# Patient Record
Sex: MALE | NOT HISPANIC OR LATINO
[De-identification: names, ages, dates, MRNs, and addresses within clinical notes are randomized per-mention and may not be internally consistent; named-entity substitution may affect disease eponyms.]

---

## 2021-09-10 PROBLEM — Z00.00 ENCOUNTER FOR PREVENTIVE HEALTH EXAMINATION: Status: ACTIVE | Noted: 2021-09-10

## 2021-09-20 ENCOUNTER — APPOINTMENT (OUTPATIENT)
Dept: OTOLARYNGOLOGY | Facility: CLINIC | Age: 31
End: 2021-09-20

## 2022-01-31 ENCOUNTER — NON-APPOINTMENT (OUTPATIENT)
Age: 32
End: 2022-01-31

## 2022-02-02 ENCOUNTER — APPOINTMENT (OUTPATIENT)
Dept: OTOLARYNGOLOGY | Facility: CLINIC | Age: 32
End: 2022-02-02
Payer: MEDICAID

## 2022-02-02 VITALS — HEIGHT: 74 IN | BODY MASS INDEX: 23.74 KG/M2 | WEIGHT: 185 LBS

## 2022-02-02 PROCEDURE — 99204 OFFICE O/P NEW MOD 45 MIN: CPT | Mod: 25

## 2022-02-02 PROCEDURE — 69210 REMOVE IMPACTED EAR WAX UNI: CPT

## 2022-02-02 NOTE — PHYSICAL EXAM
[Midline] : trachea located in midline position [Normal] : no rashes [FreeTextEntry1] : Procedure: Microscopic Ear Exam with debridement\par \par Left ear:  Ear canal intact without inflammation or lesion.  \par Tympanic membrane intact without inflammation.\par \par Right ear:  Obstructing cerumen debrided with a curet.  Ear canal intact without inflammation or lesion.  \par Tympanic membrane intact without inflammation. Limited pars flaccida retraction with no visible mass.\par \par [place] Hearing Assessment\par 512 Hz:\par Richards test: referred to the bilateral\par Rinne test:\par 	Right Ear: Air Conduction < Bone Conduction\par 	Left Ear:   Air Conduction < Bone conduction\par \par

## 2022-02-02 NOTE — HISTORY OF PRESENT ILLNESS
[de-identified] : PEDRO JOHNSON has a history of progressive hearing loss bilaterally since or about 2017. Uncertain etiology, but relates to stress.  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  No procedures.

## 2022-02-02 NOTE — CONSULT LETTER
[Please see my note below.] : Please see my note below. [FreeTextEntry2] : Dr Sheri Wnislow\par [address] [facility]\[place] [address] [FreeTextEntry1] : Thank you for allowing me to participate in the care of PEDRO JOHNSON .\par Please see the attached visit note.\par \par \par \par Simon Ozuna\par Otology\par Medical Director of [facility]\par Department of Otolaryngology\par [facility]

## 2022-02-02 NOTE — DATA REVIEWED
[de-identified] : Multiple audiograms provided and reviewed. Testing in November 2021, reveals bilateral conductive hearing loss affecting the right ear greater than the left. Recognition is intact bilaterally. Acoustic reflexes are absent bilaterally [de-identified] : The patient had multiple images from a CT scan of his temporal bone on his telephone. A brief view which was incomplete, revealed bilateral ossicular chain intact. No visible otosclerosis.

## 2022-02-02 NOTE — ASSESSMENT
[FreeTextEntry1] : Significant conductive hearing loss bilaterally, which is interfering with the patient's daily, communication. He is dissatisfied with a recent trial with hearing aids. Management options carefully reviewed in detail.\par \par The etiology of his hearing loss is undetermined. The most likely diagnosis is otosclerosis. A limited pars flaccida retraction is visible and may represent occult cholesteatoma over there is little evidence for this. I have requested an opportunity to review his CT scan in its entirety. We discussed the option of middle ear exploration for surgical repair of conductive hearing loss. He wished to consider this. All risks limitations, complications and alternatives reviewed in detail.  Questions answered.\par \par Surgical plan: Right laser stapedotomy, left hand, vein graft, possible cartilage tympanoplasty ossiculoplasty.\par \par

## 2022-02-15 ENCOUNTER — NON-APPOINTMENT (OUTPATIENT)
Age: 32
End: 2022-02-15

## 2022-04-21 ENCOUNTER — APPOINTMENT (OUTPATIENT)
Age: 32
End: 2022-04-21

## 2022-04-25 ENCOUNTER — APPOINTMENT (OUTPATIENT)
Dept: OTOLARYNGOLOGY | Facility: CLINIC | Age: 32
End: 2022-04-25

## 2022-05-12 ENCOUNTER — APPOINTMENT (OUTPATIENT)
Dept: OTOLARYNGOLOGY | Facility: CLINIC | Age: 32
End: 2022-05-12
Payer: MEDICAID

## 2022-05-12 PROCEDURE — 99212 OFFICE O/P EST SF 10 MIN: CPT | Mod: 95

## 2022-05-12 NOTE — HISTORY OF PRESENT ILLNESS
[Home] : at home, [unfilled] , at the time of the visit. [Medical Office: ([facility])___] : at the medical office located in  [Verbal consent obtained from patient] : the patient, [unfilled] [de-identified] : PEDRO JOHNSON has a history of progressive hearing loss bilaterally since or about 2017. Uncertain etiology, but relates to stress.  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  No procedures. \par \par April 2022: plan stapes surgery. Patient canceled surgery to anxiety [FreeTextEntry1] : may 12, 2022:\par \par Tele-health visit for review of planning. The patient wishes to proceed with surgical intervention as previously scheduled. He feels that his anxiety got the better of him last time and he has reviewed it. He feels comfortable going forward. I have discussed this with him in detail. I have answered his questions.

## 2022-05-12 NOTE — ASSESSMENT
[FreeTextEntry1] : operative intervention discussed with patient in detail.He is comfortable moving forward with surgical planning. I have answered his questions. We reviewed the procedure in detail.\par \par He wished to proceed with surgical intervention.

## 2022-05-19 ENCOUNTER — APPOINTMENT (OUTPATIENT)
Age: 32
End: 2022-05-19
Payer: MEDICAID

## 2022-05-19 ENCOUNTER — TRANSCRIPTION ENCOUNTER (OUTPATIENT)
Age: 32
End: 2022-05-19

## 2022-05-19 ENCOUNTER — RESULT REVIEW (OUTPATIENT)
Age: 32
End: 2022-05-19

## 2022-05-19 PROCEDURE — 69661 REVISE MIDDLE EAR BONE: CPT | Mod: RT

## 2022-05-19 PROCEDURE — 95867 NDL EMG CRANIAL NRV MUSC UNI: CPT | Mod: 26

## 2022-05-19 PROCEDURE — 37799 UNLISTED PX VASCULAR SURGERY: CPT | Mod: RT

## 2022-05-23 ENCOUNTER — APPOINTMENT (OUTPATIENT)
Dept: OTOLARYNGOLOGY | Facility: CLINIC | Age: 32
End: 2022-05-23
Payer: MEDICAID

## 2022-05-23 DIAGNOSIS — H90.0 CONDUCTIVE HEARING LOSS, BILATERAL: ICD-10-CM

## 2022-05-23 PROCEDURE — 99024 POSTOP FOLLOW-UP VISIT: CPT

## 2022-05-23 NOTE — HISTORY OF PRESENT ILLNESS
[de-identified] : PEDRO JOHNSON has a history of progressive hearing loss bilaterally since or about 2017. Uncertain etiology, but relates to stress.  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  No procedures. \par \par April 2022: plan stapes surgery. Patient canceled surgery to anxiety [FreeTextEntry1] : 05/23/2022 \par History\par post operative visit.\par Reports no significant pain.  No significant tinnitus.  No significant vertigo.  No bleeding reported.\par \par Physical Exam\par \par Face: Normal Function bilaterally\par \par Oral: Normal\par \par Neck: No mass, Full range of motion\par \par Hand: Incision intact, non inflamed\par \par \par Microscopic Ear Exam\par Right Ear:  Ear canal packing removed gently with forceps.  No significant inflammation noted.  The tympanic membrane is intact.\par \par T[place] Testing\par 512 Hz:\par Richards test: referred to the Right Ear\par \par Wound care instructions were reviewed with the patient in detail. Followup plans discussed.

## 2022-06-13 ENCOUNTER — NON-APPOINTMENT (OUTPATIENT)
Age: 32
End: 2022-06-13

## 2022-06-20 ENCOUNTER — APPOINTMENT (OUTPATIENT)
Dept: OTOLARYNGOLOGY | Facility: CLINIC | Age: 32
End: 2022-06-20
Payer: MEDICAID

## 2022-06-20 VITALS — TEMPERATURE: 97.2 F | HEIGHT: 74 IN | BODY MASS INDEX: 23.74 KG/M2 | WEIGHT: 185 LBS

## 2022-06-20 PROCEDURE — 92550 TYMPANOMETRY & REFLEX THRESH: CPT

## 2022-06-20 PROCEDURE — 99024 POSTOP FOLLOW-UP VISIT: CPT

## 2022-06-20 PROCEDURE — 92557 COMPREHENSIVE HEARING TEST: CPT

## 2022-06-20 NOTE — HISTORY OF PRESENT ILLNESS
[de-identified] : PEDRO JOHNSON has a history of progressive hearing loss bilaterally since or about 2017. Uncertain etiology, but relates to stress.  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  No procedures. \par \par April 2022: plan stapes surgery. Patient canceled surgery to anxiety [FreeTextEntry1] : 06/20/2022 \par History\par post operative visit.\par Reports no significant pain.  No significant tinnitus.  No significant vertigo. Hearing has improved\par \par Physical Exam\par \par Hand: Incision intact, non inflamed\par \par Microscopic Ear Exam\par Right Ear:  Ear canal healing well.  The tympanic membrane is intact.\par \par Complete audiometry was ordered and completed today. This was separately reported by the audiologist. The results were reviewed in detail with the patient.\par \par \par

## 2022-06-20 NOTE — ASSESSMENT
[FreeTextEntry1] : Good hearing results following right stapes surgery.  Patient is interested in surgical repair of the left ear.  I have discussed this with him in detail.  All risks limitations, complications and alternatives reviewed in detail.  Questions answered.\par \par Surgical plan: Left laser stapedotomy, right hand vein graft

## 2022-07-28 ENCOUNTER — APPOINTMENT (OUTPATIENT)
Dept: OTOLARYNGOLOGY | Facility: CLINIC | Age: 32
End: 2022-07-28

## 2022-07-28 PROCEDURE — 99442: CPT | Mod: 24

## 2022-07-28 NOTE — HISTORY OF PRESENT ILLNESS
[de-identified] : PEDRO JOHNSON has a history of progressive hearing loss bilaterally since or about 2017. Uncertain etiology, but relates to stress.  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  No procedures. \par \par April 2022: plan stapes surgery. Patient canceled surgery to anxiety [FreeTextEntry1] : 07/28/2022 \par hearing better. but feels that hearing may have recently worsened partially (not sure). still  has tinnitus in right ear worse that the left. \par  pain, arm

## 2022-07-28 NOTE — ASSESSMENT
[FreeTextEntry1] : The patient reports significantly improved hearing in the operated right ear. Tinnitus persists. This is better in the right ear the left. I have reviewed wound care and activities following his surgery.We also discussed the option of surgical intervention for his left ear.\par \par I have recommended repeat audiometry  approximately 3 months. I recommended follow up after completing audiometry.

## 2022-11-01 ENCOUNTER — APPOINTMENT (OUTPATIENT)
Dept: OTOLARYNGOLOGY | Facility: CLINIC | Age: 32
End: 2022-11-01

## 2022-11-01 ENCOUNTER — LABORATORY RESULT (OUTPATIENT)
Age: 32
End: 2022-11-01

## 2022-11-01 VITALS — TEMPERATURE: 97.3 F | HEIGHT: 74 IN | WEIGHT: 185 LBS | BODY MASS INDEX: 23.74 KG/M2

## 2022-11-01 DIAGNOSIS — H61.20 IMPACTED CERUMEN, UNSPECIFIED EAR: ICD-10-CM

## 2022-11-01 DIAGNOSIS — H93.299 OTHER ABNORMAL AUDITORY PERCEPTIONS, UNSPECIFIED EAR: ICD-10-CM

## 2022-11-01 PROCEDURE — 92550 TYMPANOMETRY & REFLEX THRESH: CPT | Mod: 52

## 2022-11-01 PROCEDURE — 99213 OFFICE O/P EST LOW 20 MIN: CPT | Mod: 25

## 2022-11-01 PROCEDURE — G0268 REMOVAL OF IMPACTED WAX MD: CPT

## 2022-11-01 PROCEDURE — 92557 COMPREHENSIVE HEARING TEST: CPT

## 2022-11-01 NOTE — ASSESSMENT
[FreeTextEntry1] : Improved hearing in the right ear following stapedectomy May 2022.  The patient wishes to proceed with surgical planning for the left ear.  Management options reviewed.  All risks limitations, complications and alternatives reviewed in detail.  Questions answered.\par \par

## 2022-11-01 NOTE — HISTORY OF PRESENT ILLNESS
[de-identified] : PEDRO JOHNSON has a history of progressive hearing loss bilaterally since or about 2017. Uncertain etiology, but relates to stress.  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  No procedures. \par \par April 2022: plan stapes surgery. Patient canceled surgery to anxiety [FreeTextEntry1] : 11/01/2022\par \par hearing better. tinnitus is noted uncertain laterality. No vertigo. \par Still reports significant hearing loss in the left ear

## 2022-11-01 NOTE — DATA REVIEWED
[de-identified] : In light of the patients current symptoms, Complete audiometry was ordered and completed today. I have interpreted these results and reviewed them in detail with the patient.\par \par Significant conductive hearing loss in the left ear.  Mild hearing loss remains in the right ear.

## 2022-11-03 ENCOUNTER — RESULT REVIEW (OUTPATIENT)
Age: 32
End: 2022-11-03

## 2022-11-03 ENCOUNTER — APPOINTMENT (OUTPATIENT)
Age: 32
End: 2022-11-03

## 2022-11-03 PROCEDURE — 95867 NDL EMG CRANIAL NRV MUSC UNI: CPT | Mod: 26

## 2022-11-03 PROCEDURE — 69661 REVISE MIDDLE EAR BONE: CPT | Mod: LT

## 2022-11-03 PROCEDURE — 37799 UNLISTED PX VASCULAR SURGERY: CPT | Mod: RT

## 2022-11-09 ENCOUNTER — APPOINTMENT (OUTPATIENT)
Dept: OTOLARYNGOLOGY | Facility: CLINIC | Age: 32
End: 2022-11-09

## 2022-11-09 VITALS — WEIGHT: 185 LBS | TEMPERATURE: 97.1 F | HEIGHT: 74 IN | BODY MASS INDEX: 23.74 KG/M2

## 2022-11-09 PROCEDURE — 99024 POSTOP FOLLOW-UP VISIT: CPT

## 2022-11-09 NOTE — HISTORY OF PRESENT ILLNESS
[de-identified] : PEDRO JOHNSON has a history of progressive hearing loss bilaterally since or about 2017. Uncertain etiology, but relates to stress.  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  No procedures. \par \par April 2022: plan stapes surgery. Patient canceled surgery to anxiety [FreeTextEntry1] : 11/09/2022 \par History\par post operative visit.\par Reports no significant pain.  No significant tinnitus.  No significant vertigo.  No bleeding reported.\par \par Physical Exam\par \par Face: Normal Function bilaterally\par \par Oral: Normal\par \par Neck: No mass, Full range of motion\par \par Hand: Incision intact, non inflamed\par \par \par Microscopic Ear Exam\par Left Ear:  Ear canal packing removed gently with forceps.  No significant inflammation noted.  The tympanic membrane is intact.\par \par T[place] Testing\par 512 Hz:\par Richards test: referred to the midline \par \par Wound care instructions were reviewed with the patient in detail. Followup plans discussed.

## 2022-12-14 ENCOUNTER — APPOINTMENT (OUTPATIENT)
Dept: OTOLARYNGOLOGY | Facility: CLINIC | Age: 32
End: 2022-12-14

## 2022-12-14 VITALS — TEMPERATURE: 95.7 F | BODY MASS INDEX: 23.74 KG/M2 | HEIGHT: 74 IN | WEIGHT: 185 LBS

## 2022-12-14 DIAGNOSIS — H80.93 UNSPECIFIED OTOSCLEROSIS, BILATERAL: ICD-10-CM

## 2022-12-14 DIAGNOSIS — H90.A11 CONDUCTIVE HEARING LOSS, UNILATERAL, RIGHT EAR WITH RESTRICTED HEARING ON THE CONTRALATERAL SIDE: ICD-10-CM

## 2022-12-14 PROCEDURE — 92557 COMPREHENSIVE HEARING TEST: CPT

## 2022-12-14 PROCEDURE — 99024 POSTOP FOLLOW-UP VISIT: CPT

## 2022-12-14 PROCEDURE — 92567 TYMPANOMETRY: CPT

## 2022-12-14 RX ORDER — METHYLPREDNISOLONE 4 MG/1
4 TABLET ORAL
Qty: 1 | Refills: 0 | Status: DISCONTINUED | COMMUNITY
Start: 2022-11-03 | End: 2022-12-14

## 2022-12-14 RX ORDER — CEFADROXIL 500 MG/1
500 CAPSULE ORAL TWICE DAILY
Qty: 4 | Refills: 1 | Status: DISCONTINUED | COMMUNITY
Start: 2022-11-03 | End: 2022-12-14

## 2022-12-14 RX ORDER — METHYLPREDNISOLONE 4 MG/1
4 TABLET ORAL
Qty: 1 | Refills: 0 | Status: DISCONTINUED | COMMUNITY
Start: 2022-05-19 | End: 2022-12-14

## 2022-12-14 RX ORDER — ESCITALOPRAM OXALATE 5 MG/1
TABLET, FILM COATED ORAL
Refills: 0 | Status: ACTIVE | COMMUNITY

## 2022-12-14 NOTE — HISTORY OF PRESENT ILLNESS
[de-identified] : PEDRO JOHNSON has a history of progressive hearing loss bilaterally since or about 2017. Uncertain etiology, but relates to stress.  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  No procedures. \par \par April 2022: plan stapes surgery. Patient canceled surgery to anxiety [FreeTextEntry1] : 12/14/2022 \par History\par post operative visit.\par Reports no significant pain.  No significant tinnitus.  No significant vertigo. Hearing has improved\par \par Physical Exam\par \par Hand: Incision intact, non inflamed\par \par Microscopic Ear Exam\par Left Ear:  Ear canal healing well.  The tympanic membrane is intact.\par \par Complete audiometry was ordered and completed today. This was separately reported by the audiologist. The results were reviewed in detail with the patient.\par

## 2022-12-14 NOTE — REASON FOR VISIT
[Subsequent Evaluation] : a subsequent evaluation for [FreeTextEntry2] : 1st month POA - Stapedectomy

## 2022-12-15 PROBLEM — H90.A11 CONDUCTIVE HEARING LOSS OF RIGHT EAR WITH RESTRICTED HEARING OF LEFT EAR: Status: ACTIVE | Noted: 2022-12-15
